# Patient Record
Sex: MALE | Race: WHITE | NOT HISPANIC OR LATINO | Employment: OTHER | URBAN - METROPOLITAN AREA
[De-identification: names, ages, dates, MRNs, and addresses within clinical notes are randomized per-mention and may not be internally consistent; named-entity substitution may affect disease eponyms.]

---

## 2023-05-03 LAB
ALBUMIN (MG/L) IN URINE: 17.3 MG/L
ALBUMIN/CREATININE (UG/MG) IN URINE: 16 UG/MG CRT (ref 0–30)
CALCIDIOL (25 OH VITAMIN D3) (NG/ML) IN SER/PLAS: 75 NG/ML
CHOLESTEROL (MG/DL) IN SER/PLAS: 170 MG/DL (ref 0–199)
CHOLESTEROL IN HDL (MG/DL) IN SER/PLAS: 61.7 MG/DL
CHOLESTEROL/HDL RATIO: 2.8
CREATININE (MG/DL) IN URINE: 108 MG/DL (ref 20–370)
LDL: 93 MG/DL (ref 0–99)
PROSTATE SPECIFIC AG (NG/ML) IN SER/PLAS: 4.11 NG/ML (ref 0–4)
TRIGLYCERIDE (MG/DL) IN SER/PLAS: 79 MG/DL (ref 0–149)
VLDL: 16 MG/DL (ref 0–40)

## 2023-09-27 PROBLEM — D22.70 MELANOCYTIC NEVI OF LOWER LIMB, INCLUDING HIP: Status: ACTIVE | Noted: 2023-05-23

## 2023-09-27 PROBLEM — R49.0 CHRONIC HOARSENESS: Status: ACTIVE | Noted: 2023-06-13

## 2023-09-27 PROBLEM — I25.10 CORONARY ARTERY STENOSIS: Status: ACTIVE | Noted: 2023-09-27

## 2023-09-27 PROBLEM — I44.0 FIRST DEGREE AV BLOCK: Status: ACTIVE | Noted: 2023-09-27

## 2023-09-27 PROBLEM — S01.302D UNSPECIFIED OPEN WOUND OF LEFT EAR, SUBSEQUENT ENCOUNTER: Status: ACTIVE | Noted: 2023-05-23

## 2023-09-27 PROBLEM — D22.39 MELANOCYTIC NEVI OF OTHER PARTS OF FACE: Status: ACTIVE | Noted: 2023-05-23

## 2023-09-27 PROBLEM — D48.5 NEOPLASM OF UNCERTAIN BEHAVIOR OF SKIN: Status: ACTIVE | Noted: 2023-05-23

## 2023-09-27 PROBLEM — E55.9 VITAMIN D DEFICIENCY: Status: ACTIVE | Noted: 2023-09-27

## 2023-09-27 PROBLEM — I45.10 RIGHT BUNDLE BRANCH BLOCK (RBBB): Status: ACTIVE | Noted: 2023-09-27

## 2023-09-27 PROBLEM — C44.219 BASAL CELL CARCINOMA OF SKIN OF LEFT EAR AND EXTERNAL AURICULAR CANAL: Status: ACTIVE | Noted: 2023-05-23

## 2023-09-27 PROBLEM — K91.840 COLONOSCOPY CAUSING POST-PROCEDURAL BLEEDING: Status: ACTIVE | Noted: 2023-09-27

## 2023-09-27 PROBLEM — D04.21 CARCINOMA IN SITU OF SKIN OF RIGHT EAR AND EXTERNAL AURICULAR CANAL: Status: ACTIVE | Noted: 2023-05-23

## 2023-09-27 PROBLEM — D49.2 NEOPLASM OF SKIN OF EAR: Status: ACTIVE | Noted: 2023-09-27

## 2023-09-27 PROBLEM — E04.9 GOITER: Status: ACTIVE | Noted: 2023-06-13

## 2023-09-27 PROBLEM — L82.1 OTHER SEBORRHEIC KERATOSIS: Status: ACTIVE | Noted: 2023-05-23

## 2023-09-27 PROBLEM — K21.9 GASTROESOPHAGEAL REFLUX DISEASE WITHOUT ESOPHAGITIS: Status: ACTIVE | Noted: 2023-06-13

## 2023-09-27 PROBLEM — E04.1 THYROID NODULE: Status: ACTIVE | Noted: 2023-07-13

## 2023-09-27 PROBLEM — L57.0 ACTINIC KERATOSIS OF LEFT TEMPLE: Status: ACTIVE | Noted: 2023-05-23

## 2023-09-27 PROBLEM — Z85.828 PERSONAL HISTORY OF OTHER MALIGNANT NEOPLASM OF SKIN: Status: ACTIVE | Noted: 2023-05-23

## 2023-09-27 PROBLEM — R97.20 ELEVATED PSA: Status: ACTIVE | Noted: 2023-09-27

## 2023-09-27 PROBLEM — D22.5 MELANOCYTIC NEVI OF TRUNK: Status: ACTIVE | Noted: 2023-05-23

## 2023-09-27 PROBLEM — D18.01 HEMANGIOMA OF SKIN AND SUBCUTANEOUS TISSUE: Status: ACTIVE | Noted: 2023-05-23

## 2023-09-27 PROBLEM — C44.222 SQUAMOUS CELL CARCINOMA OF SKIN OF RIGHT EAR AND EXTERNAL AURICULAR CANAL: Status: ACTIVE | Noted: 2023-05-23

## 2023-09-27 PROBLEM — R94.31 ABNORMAL EKG: Status: ACTIVE | Noted: 2023-09-27

## 2023-09-27 PROBLEM — L90.5 SCAR CONDITION AND FIBROSIS OF SKIN: Status: ACTIVE | Noted: 2023-05-23

## 2023-09-27 PROBLEM — E78.5 HYPERLIPIDEMIA: Status: ACTIVE | Noted: 2023-09-27

## 2023-09-27 PROBLEM — R03.0 ELEVATED BLOOD PRESSURE READING: Status: ACTIVE | Noted: 2023-09-27

## 2023-09-27 PROBLEM — L81.4 OTHER MELANIN HYPERPIGMENTATION: Status: ACTIVE | Noted: 2023-05-23

## 2023-09-27 PROBLEM — J37.0 CHRONIC LARYNGITIS: Status: ACTIVE | Noted: 2023-06-13

## 2023-09-27 PROBLEM — D22.61 MELANOCYTIC NEVI OF RIGHT UPPER LIMB, INCLUDING SHOULDER: Status: ACTIVE | Noted: 2023-05-23

## 2023-09-27 RX ORDER — ASPIRIN 325 MG
50000 TABLET, DELAYED RELEASE (ENTERIC COATED) ORAL WEEKLY
COMMUNITY
Start: 2022-03-24 | End: 2024-01-13

## 2023-09-27 RX ORDER — ATORVASTATIN CALCIUM 10 MG/1
10 TABLET, FILM COATED ORAL DAILY
COMMUNITY
Start: 2015-05-18 | End: 2023-11-01 | Stop reason: DRUGHIGH

## 2023-10-10 ENCOUNTER — OFFICE VISIT (OUTPATIENT)
Dept: DERMATOLOGY | Facility: CLINIC | Age: 73
End: 2023-10-10
Payer: MEDICARE

## 2023-10-10 DIAGNOSIS — L81.4 LENTIGO: ICD-10-CM

## 2023-10-10 DIAGNOSIS — L73.9 FOLLICULITIS: ICD-10-CM

## 2023-10-10 DIAGNOSIS — L91.8 SKIN TAG: ICD-10-CM

## 2023-10-10 DIAGNOSIS — L57.0 ACTINIC KERATOSIS: Primary | ICD-10-CM

## 2023-10-10 DIAGNOSIS — D23.21 WEATHERING NODULE OF RIGHT EAR: ICD-10-CM

## 2023-10-10 DIAGNOSIS — L90.5 SCAR CONDITIONS AND FIBROSIS OF SKIN: ICD-10-CM

## 2023-10-10 DIAGNOSIS — L82.1 SEBORRHEIC KERATOSIS: ICD-10-CM

## 2023-10-10 PROCEDURE — 17003 DESTRUCT PREMALG LES 2-14: CPT | Performed by: STUDENT IN AN ORGANIZED HEALTH CARE EDUCATION/TRAINING PROGRAM

## 2023-10-10 PROCEDURE — 17000 DESTRUCT PREMALG LESION: CPT | Performed by: STUDENT IN AN ORGANIZED HEALTH CARE EDUCATION/TRAINING PROGRAM

## 2023-10-10 PROCEDURE — 1160F RVW MEDS BY RX/DR IN RCRD: CPT | Performed by: STUDENT IN AN ORGANIZED HEALTH CARE EDUCATION/TRAINING PROGRAM

## 2023-10-10 PROCEDURE — 1126F AMNT PAIN NOTED NONE PRSNT: CPT | Performed by: STUDENT IN AN ORGANIZED HEALTH CARE EDUCATION/TRAINING PROGRAM

## 2023-10-10 PROCEDURE — 1159F MED LIST DOCD IN RCRD: CPT | Performed by: STUDENT IN AN ORGANIZED HEALTH CARE EDUCATION/TRAINING PROGRAM

## 2023-10-10 PROCEDURE — 99213 OFFICE O/P EST LOW 20 MIN: CPT | Performed by: STUDENT IN AN ORGANIZED HEALTH CARE EDUCATION/TRAINING PROGRAM

## 2023-10-10 PROCEDURE — 1036F TOBACCO NON-USER: CPT | Performed by: STUDENT IN AN ORGANIZED HEALTH CARE EDUCATION/TRAINING PROGRAM

## 2023-10-10 ASSESSMENT — DERMATOLOGY QUALITY OF LIFE (QOL) ASSESSMENT
WHAT SINGLE SKIN CONDITION LISTED BELOW IS THE PATIENT ANSWERING THE QUALITY-OF-LIFE ASSESSMENT QUESTIONS ABOUT: NONE OF THE ABOVE
RATE HOW BOTHERED YOU ARE BY EFFECTS OF YOUR SKIN PROBLEMS ON YOUR ACTIVITIES (EG, GOING OUT, ACCOMPLISHING WHAT YOU WANT, WORK ACTIVITIES OR YOUR RELATIONSHIPS WITH OTHERS): 0 - NEVER BOTHERED
RATE HOW EMOTIONALLY BOTHERED YOU ARE BY YOUR SKIN PROBLEM (FOR EXAMPLE, WORRY, EMBARRASSMENT, FRUSTRATION): 0 - NEVER BOTHERED
RATE HOW BOTHERED YOU ARE BY SYMPTOMS OF YOUR SKIN PROBLEM (EG, ITCHING, STINGING BURNING, HURTING OR SKIN IRRITATION): 0 - NEVER BOTHERED

## 2023-10-10 ASSESSMENT — DERMATOLOGY PATIENT ASSESSMENT
HAVE YOU HAD OR DO YOU HAVE A STAPH INFECTION: NO
HAVE YOU HAD OR DO YOU HAVE VASCULAR DISEASE: YES
DO YOU USE SUNSCREEN: OCCASIONALLY
DO YOU HAVE ANY NEW OR CHANGING LESIONS: YES
WHERE ARE THESE NEW OR CHANGING LESIONS LOCATED: RIGHT EAR
ARE YOU AN ORGAN TRANSPLANT RECIPIENT: NO

## 2023-10-10 ASSESSMENT — PATIENT GLOBAL ASSESSMENT (PGA): PATIENT GLOBAL ASSESSMENT: PATIENT GLOBAL ASSESSMENT:  2 - MILD

## 2023-10-10 ASSESSMENT — ITCH NUMERIC RATING SCALE: HOW SEVERE IS YOUR ITCHING?: 0

## 2023-10-10 NOTE — PROGRESS NOTES
Subjective     Ricky Go is a 73 y.o. male who presents for the following: Skin Check. Patient notes several spots of concern:  1) right preauricular cheek s/p Mohs 8/2023 - notes it is well healing, but notes a area of nodularity on the inferior portion of scar  2) White spot on the right antehelix, mildly tender  3) Scaly spots on the cheeks and forehead  4) Red spot on the right flank for the last 2 weeks.     Patient otherwise denies any new, changing, growing, bleeding, painful, itchy, or otherwise bothersome lesions. Patient denies any lesions of concern.      Review of Systems:  No other skin or systemic complaints other than what is documented elsewhere in the note.    The following portions of the chart were reviewed this encounter and updated as appropriate:         Skin Cancer History  No skin cancer on file.  Hx of BCC and SCC, no melanoma  Lesion 1: Nodular Basal Cell Carcinoma. Year Diagnosed: 2019. June. Location: Left [place]. Treatment(s): mohs done by Dr. Robertson 7/26/19. Pathology: [id number]   Lesion 2: Invasive squamous cell carcinoma. Year Diagnosed: 2022. May. Location: Right Ear. Treatment(s): Mohs 7/25/2022 Dr. 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 Robertson 8/28/23      Specialty Problems          Dermatology Problems    Actinic keratosis of left temple    Hemangioma of skin and subcutaneous tissue    Melanocytic nevi of lower limb, including hip    Melanocytic nevi of other parts of face    Melanocytic nevi of right upper limb, including shoulder    Melanocytic nevi of trunk    Neoplasm of uncertain behavior of skin    Other melanin hyperpigmentation    Other seborrheic keratosis    Personal history of other malignant neoplasm of skin    Scar condition and fibrosis of skin        Objective   Well appearing patient in no apparent distress; mood and affect are within normal limits.    A full examination was performed including scalp,  head, eyes, ears, nose, lips, neck, chest, axillae, abdomen, back, buttocks, bilateral upper extremities, bilateral lower extremities, hands, feet, fingers, toes, fingernails, and toenails. All findings within normal limits unless otherwise noted below.    Assessment/Plan   1. Actinic keratosis (3)  Left Malar Cheek, Mid Supratip of Nose, Right Zygomatic Area  Erythematous macules with gritty scale.    Actinic Keratoses - face.  The pre-cancerous nature of these lesions and treatment options were discussed with the patient today.  At this time, I recommend treatment with liquid nitrogen cryotherapy.  The patient expressed understanding, is in agreement with this plan, and wishes to proceed with cryotherapy today.      Destr of lesion - Left Malar Cheek, Mid Supratip of Nose, Right Zygomatic Area  Complexity: simple    Destruction method: cryotherapy    Informed consent: discussed and consent obtained    Lesion destroyed using liquid nitrogen: Yes    Cryotherapy cycles:  2  Outcome: patient tolerated procedure well with no complications    Post-procedure details: wound care instructions given      Related Procedures  Follow Up In Dermatology - Established Patient    2. Scar conditions and fibrosis of skin  Left Preauricular Area  Well healed scar with central area of focal nodularity and open skin at the inferior border    On the patient's left preauricular cheek, there are well-healed scars with no evidence of recurrent growth on exam today. Discussed that focal area of nodularity at the inferior pole is likely spitting stitch and patient should expect it to work itself out over the next few weeks. Discussed no need for managmenet, but advised that it may be more irritated and feel/look like a pimple over the next few weeks      3. Lentigo (3)  Head, Left Arm, Right Arm  Scattered tan macules in sun-exposed areas.    Solar Lentigines and photodamage.  The clinically benign-appearing nature of these lesions and their  relation to chronic sun exposure were discussed with the patient today and reassurance provided.  None of these lesions meet threshold for biopsy today, and thus no treatment is medically indicated for these lesions at this time.  The signs and symptoms of skin cancer were reviewed and the patient was advised to practice sun protection and sun avoidance, use daily sunscreen, and perform regular self skin exams.  The patient was instructed to monitor these lesions for any changes, such as in size, shape, or color, or associated symptoms and to call our office to schedule a return visit for re-evaluation if any such changes or symptoms are noticed in the future.  The patient expressed understanding and is in agreement with this plan.      4. Skin tag  Fleshy, skin-colored sessile and pedunculated papules.     Skin tags - scattered on trunk.  The benign nature of these lesions was discussed with the patient today and reassurance provided.  No treatment is medically indicated for these lesions at this time.     5. Seborrheic keratosis  Stuck on verrucous, tan-brown papules and plaques.      Seborrheic Keratoses - scattered on face, neck, trunk, and extremities.  The benign nature of these lesions was discussed with the patient today and reassurance provided.  No treatment is medically indicated for these lesions at this time.      6. Folliculitis  Right Flank  Follicularly-based erythematous papules and pustules.    Folliculitis - right flank.  The bacterial nature of this condition and treatment options were discussed with the patient today.  At this time, I recommend topical antibiotic therapy with Clindamycin 1% lotion, but after discussion with the patient, he defers and would like to just watch the area. The risks, benefits, and side effects of this medication were discussed.  The patient expressed understanding and is in agreement with this plan.      7. Weathering nodule of right ear  Right Ear  White, atrophic  papule with surrounding blanchable erythema    Weathering nodule - right antihelix. The benign nature of these lesions was discussed with the patient today and reassurance provided.  No treatment is medically indicated for these lesions at this time.

## 2023-11-01 ENCOUNTER — LAB (OUTPATIENT)
Dept: LAB | Facility: LAB | Age: 73
End: 2023-11-01
Payer: MEDICARE

## 2023-11-01 ENCOUNTER — OFFICE VISIT (OUTPATIENT)
Dept: PRIMARY CARE | Facility: CLINIC | Age: 73
End: 2023-11-01
Payer: MEDICARE

## 2023-11-01 VITALS
RESPIRATION RATE: 18 BRPM | OXYGEN SATURATION: 98 % | DIASTOLIC BLOOD PRESSURE: 78 MMHG | WEIGHT: 184.3 LBS | SYSTOLIC BLOOD PRESSURE: 138 MMHG | BODY MASS INDEX: 28.93 KG/M2 | TEMPERATURE: 96.6 F | HEIGHT: 67 IN | HEART RATE: 80 BPM

## 2023-11-01 DIAGNOSIS — R97.20 ELEVATED PSA: Primary | ICD-10-CM

## 2023-11-01 DIAGNOSIS — R97.20 ELEVATED PSA: ICD-10-CM

## 2023-11-01 PROCEDURE — 84154 ASSAY OF PSA FREE: CPT

## 2023-11-01 PROCEDURE — 1160F RVW MEDS BY RX/DR IN RCRD: CPT | Performed by: INTERNAL MEDICINE

## 2023-11-01 PROCEDURE — 99214 OFFICE O/P EST MOD 30 MIN: CPT | Performed by: INTERNAL MEDICINE

## 2023-11-01 PROCEDURE — 1126F AMNT PAIN NOTED NONE PRSNT: CPT | Performed by: INTERNAL MEDICINE

## 2023-11-01 PROCEDURE — 1159F MED LIST DOCD IN RCRD: CPT | Performed by: INTERNAL MEDICINE

## 2023-11-01 PROCEDURE — 36415 COLL VENOUS BLD VENIPUNCTURE: CPT

## 2023-11-01 PROCEDURE — 84153 ASSAY OF PSA TOTAL: CPT

## 2023-11-01 PROCEDURE — 1036F TOBACCO NON-USER: CPT | Performed by: INTERNAL MEDICINE

## 2023-11-01 RX ORDER — ATORVASTATIN CALCIUM 20 MG/1
TABLET, FILM COATED ORAL
COMMUNITY
Start: 2023-10-30

## 2023-11-01 ASSESSMENT — PATIENT HEALTH QUESTIONNAIRE - PHQ9
2. FEELING DOWN, DEPRESSED OR HOPELESS: NOT AT ALL
1. LITTLE INTEREST OR PLEASURE IN DOING THINGS: NOT AT ALL
SUM OF ALL RESPONSES TO PHQ9 QUESTIONS 1 AND 2: 0

## 2023-11-01 ASSESSMENT — ENCOUNTER SYMPTOMS
LOSS OF SENSATION IN FEET: 0
OCCASIONAL FEELINGS OF UNSTEADINESS: 0
DEPRESSION: 0

## 2023-11-01 NOTE — PROGRESS NOTES
"Subjective   Patient ID: Ricky Go is a 73 y.o. male who presents for Follow-up.    HPI patient is here for follow-up from his annual Medicare wellness performed in May.  The reason for the follow-up was a slightly high PSA at 4.1.  His PSA had been going up steadily.  He really does not have any significant prostate symptoms.    Review of Systems  Cardiovascular negative sees cardiologist.  Patient is not checking his blood pressure    Objective   /78 (BP Location: Right arm, Patient Position: Sitting)   Pulse 80   Temp 35.9 °C (96.6 °F)   Resp 18   Ht 1.702 m (5' 7\")   Wt 83.6 kg (184 lb 4.9 oz)   SpO2 98%   BMI 28.87 kg/m²     Physical Exam  Abdominal:      Hernia: There is no hernia in the left inguinal area or right inguinal area.   Genitourinary:     Pubic Area: No rash.       Penis: Circumcised. No phimosis.       Testes:         Right: Varicocele present.      Epididymis:      Right: Normal.      Left: Normal.      Comments: Digital rectal exam performed secondary to rising PSA.  Sphincter tone normal stools were heme-negative and brown prostate normal size soft rubbery nontender no palpable mass  Lymphadenopathy:      Lower Body: No right inguinal adenopathy. No left inguinal adenopathy.         Assessment/Plan   Diagnoses and all orders for this visit:  Elevated PSA  -     PSA, total and free; Future  Follow-up 6 months for annual Medicare wellness  Mild elevation systolic blood pressure start checking at home with an [employer] arm device and our goal is less than 130/80      - We recommend you follow a heart healthy diet. Watch food labels and try not to eat more than 2,500 mg of sodium per day. Avoid foods high in salt like processed meats (lunch meats, madison, and sausage), processed foods (boxed dinners, canned soups), fried and fast foods. Monitor serving sizes and if the sodium per serving size is more than 200 mg, avoid those foods. If the sodium per serving size is between 100-200 mg, you " can use those in limited quantities. Try to choose foods where the amount of sodium per serving size is less than 100 mg. Try to eat a diet rich in fruits and vegetables, whole grains, low fat dairy products, skinless poultry and fish, nuts, beans, non-tropical vegetable oils. Limit saturated fat, trans fat, sodium, red meats, and sugar-sweetened beverages.   Limit alcohol      -The combination of a reduced-calorie diet and increased physical activity is recommended. Adults should aim to get at least 150 minutes of moderate physical activity per week (30 minutes of moderate physical activities at least 5 days per week). Examples of moderate physical activities include brisk walking, swimming, aerobic dancing, heavy gardening, jumping rope, bicycling 10 MPH or faster, tennis, hiking uphill or with a heavy backpack. Please let us know if you would like to learn more about your nutrition and calories and additional options including weight loss programs to help you reach your goal.      -If you smoke, stop smoking. iIf you stop smoking you can help get rid of a major source of stress to your heart. Smoking makes your heart rate and blood pressure go up and increases your risk or developing cardiovascular diseases and worsen symptoms associated with heart failure.      -Obtain a BP monitor and monitor your BP daily. Check it around the same time each day; at least 1 hour after taking your medications. Record your BP in a log and bring your log with you to your doctors appointment

## 2023-11-01 NOTE — PROGRESS NOTES
"Subjective   Patient ID: Ricky Go is a 73 y.o. male who presents for Follow-up.    HPI     Review of Systems    Objective   /78 (BP Location: Right arm, Patient Position: Sitting)   Pulse 80   Temp 35.9 °C (96.6 °F)   Resp 18   Ht 1.702 m (5' 7\")   Wt 83.6 kg (184 lb 4.9 oz)   SpO2 98%   BMI 28.87 kg/m²     Physical Exam    Assessment/Plan          "

## 2023-11-04 LAB
PSA FREE MFR SERPL: 19 %
PSA FREE SERPL-MCNC: 1 NG/ML
PSA SERPL IA-MCNC: 5.3 NG/ML (ref 0–4)

## 2023-11-06 DIAGNOSIS — R97.20 RISING PSA LEVEL: Primary | ICD-10-CM

## 2023-11-06 NOTE — RESULT ENCOUNTER NOTE
Patient notified his PSA is going up slightly.  This could be his overall normal aging process however we also see PSA go up with prostate cancer therefore I would like a urology consult

## 2023-12-15 NOTE — PROGRESS NOTES
I was present during all key portions of visit including history, exam, discussion/plan and/or procedures and directly supervised our resident during all portions of the visit, follow up care, medications and more    MD Andrea Mendez MD

## 2024-01-11 ENCOUNTER — OFFICE VISIT (OUTPATIENT)
Dept: UROLOGY | Facility: HOSPITAL | Age: 74
End: 2024-01-11
Payer: MEDICARE

## 2024-01-11 DIAGNOSIS — R97.20 RISING PSA LEVEL: ICD-10-CM

## 2024-01-11 DIAGNOSIS — E55.9 VITAMIN D DEFICIENCY: ICD-10-CM

## 2024-01-11 PROCEDURE — 1036F TOBACCO NON-USER: CPT | Performed by: UROLOGY

## 2024-01-11 PROCEDURE — 99214 OFFICE O/P EST MOD 30 MIN: CPT | Performed by: UROLOGY

## 2024-01-11 PROCEDURE — 1159F MED LIST DOCD IN RCRD: CPT | Performed by: UROLOGY

## 2024-01-11 PROCEDURE — 1160F RVW MEDS BY RX/DR IN RCRD: CPT | Performed by: UROLOGY

## 2024-01-11 PROCEDURE — 99204 OFFICE O/P NEW MOD 45 MIN: CPT | Performed by: UROLOGY

## 2024-01-11 PROCEDURE — 1126F AMNT PAIN NOTED NONE PRSNT: CPT | Performed by: UROLOGY

## 2024-01-11 ASSESSMENT — PAIN SCALES - GENERAL: PAINLEVEL: 0-NO PAIN

## 2024-01-11 NOTE — PROGRESS NOTES
NAME:Ricky Go  DATE: 2024               Subjective:   Chief complaint: Elevated PSA    HPI:  73 y.o. male presenting for evaluation of elevated PSA at the request of Dr. Taveras     Most recent PSA 5.3 (19% free).      No bothersome urinary issues.  Stream a little slower, sometime difficult to start.  No dysuria or hematuria.  Rare nocturia.      Not sexually active    Past Medical History:   Diagnosis Date    Personal history of other diseases of the circulatory system 10/30/2015    History of coronary stenosis     Past Surgical History:   Procedure Laterality Date    TONSILLECTOMY  2018    Tonsillectomy     Social History     Tobacco Use    Smoking status: Former     Types: Cigarettes     Start date: 10/1/1970     Quit date: 10/1/1989     Years since quittin.3    Smokeless tobacco: Never   Substance Use Topics    Alcohol use: Yes     Alcohol/week: 5.0 standard drinks of alcohol     Types: 5 Shots of liquor per week     No family history on file.  [unfilled]  Current Outpatient Medications on File Prior to Visit   Medication Sig Dispense Refill    atorvastatin (Lipitor) 20 mg tablet       cholecalciferol (Vitamin D-3) 1,250 mcg (50,000 unit) capsule Take 1 capsule (50,000 Units) by mouth once a week.       No current facility-administered medications on file prior to visit.     Ricky is allergic to shellfish derived.     Review of Systems    14 point ROS reviewed and discussed with the patient. Pertinent positives/negatives discussed in the History of Present Illness (HPI).      FH:  Prostate CA: No  Bladder CA: No  Kidney CA: No  Stones: No    Social History  Occupation: Retired, worked as   Tob: No  Etoh: Yes      Objective:     There is no height or weight on file to calculate BMI.   There were no vitals taken for this visit.     Physical Exam   1. Constitutional: NAD, Well-developed, Well-nourished  2. Respiratory: Unlabored breathing, no audible wheezes, no use of  accessory muscles   3. Cardiovascular: No JVD, extremities perfused, no edema  4. Abdomen: Soft, non-tender, non-distended, no masses or hernia.  No CVA tenderness.    5. Skin: no visible lesions, plaque, rashes, jaundice  6. Neuro: Gait normal, no focal neurologic deficit  7. Psychiatric: Mood and affect normal and appropriate, alert and oriented  8. :    Phallus: Normal, no lesions.     Meatus: Orthotopic and patent.   Testes:  Descended bilaterally, symmetric, without tenderness or mass.   Epididymis is normal bilaterally.  No hydrocele.  No varicocele.   Scrotum: No lesions.   Inguinal canal: No hernia or tenderness.      Labs  Lab Results   Component Value Date    PSA 5.3 (H) 11/01/2023     Lab Results   Component Value Date    GFRMALE 78 05/02/2022     Lab Results   Component Value Date    CREATININE 1.02 05/02/2022     Lab Results   Component Value Date    CHOL 170 05/03/2023     Lab Results   Component Value Date    HDL 61.7 05/03/2023     Lab Results   Component Value Date    CHHDL 2.8 05/03/2023     Lab Results   Component Value Date    LDLF 93 05/03/2023     Lab Results   Component Value Date    VLDL 16 05/03/2023     Lab Results   Component Value Date    TRIG 79 05/03/2023     Lab Results   Component Value Date    HCT 44.3 05/02/2022       Assessment/Plan:   Ricky Go presents with       1. Rising PSA level        Elevated PSA  Patient presents today for the evaluation of elevated PSA (Prostate Specific Antigen).  We discussed Prostate cancer screening, and that it is recommended for men aged 55-69, but can also start early in high risk patients ( and patients with family history of prostate cancer) and go longer in active, healthy men.  We discussed the screening process involves a digital rectal exam (KEMI) and blood test (PSA).      We discussed the role of trans-rectal ultrasound guided prostate biopsy.  I highlighted that it is an office based procedure.  He will be given a dose  of antibiotics prior to the procedure.  He was also instructed to give himself an enema the morning of the biopsy.  Risks of the biopsy including pain, blood in the urine, stool and ejaculate which can last a few weeks.  The risk of post prostate biopsy sepsis was discussed and that if signs of infection, such as fevers, chills, lethargy require a prompt evaluation.      I highlighted the role of MRI Prostate in identifying high-risk prostate cancer and images can be used to target those areas with the biopsy.      Plan to proceed with MRI prostate

## 2024-01-13 RX ORDER — ASPIRIN 325 MG
50000 TABLET, DELAYED RELEASE (ENTERIC COATED) ORAL
Qty: 12 CAPSULE | Refills: 1 | Status: SHIPPED | OUTPATIENT
Start: 2024-01-13

## 2024-02-25 ENCOUNTER — HOSPITAL ENCOUNTER (OUTPATIENT)
Dept: RADIOLOGY | Facility: HOSPITAL | Age: 74
Discharge: HOME | End: 2024-02-25
Payer: MEDICARE

## 2024-02-25 DIAGNOSIS — R97.20 RISING PSA LEVEL: ICD-10-CM

## 2024-02-25 PROCEDURE — 6100000002 MR PROSTATE SCREENING SELF PAY EXAM

## 2024-02-29 ENCOUNTER — OFFICE VISIT (OUTPATIENT)
Dept: UROLOGY | Facility: HOSPITAL | Age: 74
End: 2024-02-29
Payer: MEDICARE

## 2024-02-29 DIAGNOSIS — R97.20 ELEVATED PSA: Primary | ICD-10-CM

## 2024-02-29 PROCEDURE — 1036F TOBACCO NON-USER: CPT | Performed by: UROLOGY

## 2024-02-29 PROCEDURE — 1126F AMNT PAIN NOTED NONE PRSNT: CPT | Performed by: UROLOGY

## 2024-02-29 PROCEDURE — 1159F MED LIST DOCD IN RCRD: CPT | Performed by: UROLOGY

## 2024-02-29 PROCEDURE — 99214 OFFICE O/P EST MOD 30 MIN: CPT | Performed by: UROLOGY

## 2024-02-29 ASSESSMENT — PAIN SCALES - GENERAL: PAINLEVEL: 0-NO PAIN

## 2024-02-29 NOTE — PROGRESS NOTES
NAME:Ricky Go  DATE: 2024               Subjective:   Chief complaint: Elevated PSA    HPI:  73 y.o. male presenting for evaluation of elevated PSA at the request of Dr. Taveras     Most recent PSA 5.3 (19% free).      No bothersome urinary issues.  Stream a little slower, sometime difficult to start.  No dysuria or hematuria.  Rare nocturia.      Not sexually active    Past Medical History:   Diagnosis Date    Personal history of other diseases of the circulatory system 10/30/2015    History of coronary stenosis     Past Surgical History:   Procedure Laterality Date    TONSILLECTOMY  2018    Tonsillectomy     Social History     Tobacco Use    Smoking status: Former     Types: Cigarettes     Start date: 10/1/1970     Quit date: 10/1/1989     Years since quittin.4    Smokeless tobacco: Never   Substance Use Topics    Alcohol use: Yes     Alcohol/week: 5.0 standard drinks of alcohol     Types: 5 Shots of liquor per week     No family history on file.    Current Outpatient Medications on File Prior to Visit   Medication Sig Dispense Refill    atorvastatin (Lipitor) 20 mg tablet       cholecalciferol (Vitamin D-3) 50,000 unit capsule TAKE 1 CAPSULE BY MOUTH ONCE WEEKLY 12 capsule 1     No current facility-administered medications on file prior to visit.     Ricky is allergic to shellfish derived.     Review of Systems    14 point ROS reviewed and discussed with the patient. Pertinent positives/negatives discussed in the History of Present Illness (HPI).    FH:  Prostate CA: No  Bladder CA: No  Kidney CA: No  Stones: No    Social History  Occupation: Retired, worked as   Tob: No  Etoh: Yes      Objective:     There is no height or weight on file to calculate BMI.   There were no vitals taken for this visit.     Physical Exam   1. Constitutional: NAD, Well-developed, Well-nourished  2. Respiratory: Unlabored breathing, no audible wheezes, no use of accessory muscles   3.  Cardiovascular: No JVD, extremities perfused, no edema  4. Abdomen: Soft, non-tender, non-distended, no masses or hernia.  No CVA tenderness.    5. Skin: no visible lesions, plaque, rashes, jaundice  6. Neuro: Gait normal, no focal neurologic deficit  7. Psychiatric: Mood and affect normal and appropriate, alert and oriented    Labs  Lab Results   Component Value Date    PSA 5.3 (H) 11/01/2023     Lab Results   Component Value Date    GFRMALE 78 05/02/2022     Lab Results   Component Value Date    CREATININE 1.02 05/02/2022     Lab Results   Component Value Date    CHOL 170 05/03/2023     Lab Results   Component Value Date    HDL 61.7 05/03/2023     Lab Results   Component Value Date    CHHDL 2.8 05/03/2023     Lab Results   Component Value Date    LDLF 93 05/03/2023     Lab Results   Component Value Date    VLDL 16 05/03/2023     Lab Results   Component Value Date    TRIG 79 05/03/2023     Lab Results   Component Value Date    HCT 44.3 05/02/2022     TECHNIQUE:  Multiplanar MRI of the pelvis was obtained focused on the prostate  gland and following a screening protocol including axial, sagittal  and coronal T2 weighted SSFSE, axial T2 FSE and axial DWI. No  intravenous contrast was administered.      FINDINGS:  PROSTATE VOLUME:  The prostate measures 5.7 cm x 4.7 cm  x 6.5 cm in right-to-left,  anterior-posterior and craniocaudal dimension.      Prostate weight is estimated at 91g.      PROSTATE PARENCHYMA:  There is severe hypertrophy of the of the transition zone, consistent  with benign prostatic hyperplasia. The peripheral zone is partly  compressed. No definite focal lesion seen in the peripheral or the  transition zone.      EXTRACAPSULAR EXTENSION:  None.      SEMINAL VESICLES:  Within normal limits.      PELVIC LYMPH NODES:  No abnormally enlarged pelvic lymph nodes are identified.      PERITONEUM:  No free or loculated fluid collections are evident in the pelvis.      OTHER ORGANS:  Urinary bladder is  partially distended and diffusely thick-walled.  Layering material within the bladder may represent debris or calculi.  Visualized sigmoid colon demonstrates diverticulosis.      BONES:  No focal lesions are noted in the bone.      Exam Quality:  Is T2WI weighted imaging of diagnostic quality:  Yes. T2WI  assessment:  Adequate. Is DWI of diagnostic quality:  Yes. DWI  assessment:  Adequate. Is DCE of diagnostic quality:  N/A. DCE  assessment:  N/A. PI-QUAL score:  At least two sequences taken  together are of diagnostic quality Comments:      IMPRESSION:  1. There is no evidence of clinically significant neoplasm.  2. Severe hypertrophy of the transition zone consistent with benign  prostatic hyperplasia.    Assessment/Plan:   Ricky Go presents with       1. Elevated PSA      Reassuring MRI    Elevated PSA  Patient presents today for the evaluation of elevated PSA (Prostate Specific Antigen).  We discussed Prostate cancer screening, and that it is recommended for men aged 55-69, but can also start early in high risk patients ( and patients with family history of prostate cancer) and go longer in active, healthy men.  We discussed the screening process involves a digital rectal exam (KEMI) and blood test (PSA).      We discussed the role of trans-rectal ultrasound guided prostate biopsy.  I highlighted that it is an office based procedure.  He will be given a dose of antibiotics prior to the procedure.  He was also instructed to give himself an enema the morning of the biopsy.  Risks of the biopsy including pain, blood in the urine, stool and ejaculate which can last a few weeks.  The risk of post prostate biopsy sepsis was discussed and that if signs of infection, such as fevers, chills, lethargy require a prompt evaluation.      I highlighted the role of MRI Prostate in identifying high-risk prostate cancer and images can be used to target those areas with the biopsy.      Continue with routine  PSA screening

## 2024-04-09 ENCOUNTER — OFFICE VISIT (OUTPATIENT)
Dept: DERMATOLOGY | Facility: CLINIC | Age: 74
End: 2024-04-09
Payer: MEDICARE

## 2024-04-09 DIAGNOSIS — L57.0 ACTINIC KERATOSIS: ICD-10-CM

## 2024-04-09 DIAGNOSIS — D22.9 MULTIPLE BENIGN NEVI: ICD-10-CM

## 2024-04-09 DIAGNOSIS — L82.1 SEBORRHEIC KERATOSIS: ICD-10-CM

## 2024-04-09 DIAGNOSIS — L81.4 LENTIGO: ICD-10-CM

## 2024-04-09 DIAGNOSIS — L21.9 SEBORRHEIC DERMATITIS: Primary | ICD-10-CM

## 2024-04-09 PROCEDURE — 99213 OFFICE O/P EST LOW 20 MIN: CPT | Performed by: STUDENT IN AN ORGANIZED HEALTH CARE EDUCATION/TRAINING PROGRAM

## 2024-04-09 PROCEDURE — 17003 DESTRUCT PREMALG LES 2-14: CPT | Performed by: STUDENT IN AN ORGANIZED HEALTH CARE EDUCATION/TRAINING PROGRAM

## 2024-04-09 PROCEDURE — 17000 DESTRUCT PREMALG LESION: CPT | Performed by: STUDENT IN AN ORGANIZED HEALTH CARE EDUCATION/TRAINING PROGRAM

## 2024-04-09 PROCEDURE — 1159F MED LIST DOCD IN RCRD: CPT | Performed by: STUDENT IN AN ORGANIZED HEALTH CARE EDUCATION/TRAINING PROGRAM

## 2024-04-09 ASSESSMENT — DERMATOLOGY QUALITY OF LIFE (QOL) ASSESSMENT
WHAT SINGLE SKIN CONDITION LISTED BELOW IS THE PATIENT ANSWERING THE QUALITY-OF-LIFE ASSESSMENT QUESTIONS ABOUT: NONE OF THE ABOVE
RATE HOW EMOTIONALLY BOTHERED YOU ARE BY YOUR SKIN PROBLEM (FOR EXAMPLE, WORRY, EMBARRASSMENT, FRUSTRATION): 0 - NEVER BOTHERED
RATE HOW BOTHERED YOU ARE BY SYMPTOMS OF YOUR SKIN PROBLEM (EG, ITCHING, STINGING BURNING, HURTING OR SKIN IRRITATION): 0 - NEVER BOTHERED
RATE HOW BOTHERED YOU ARE BY EFFECTS OF YOUR SKIN PROBLEMS ON YOUR ACTIVITIES (EG, GOING OUT, ACCOMPLISHING WHAT YOU WANT, WORK ACTIVITIES OR YOUR RELATIONSHIPS WITH OTHERS): 0 - NEVER BOTHERED

## 2024-04-09 ASSESSMENT — PATIENT GLOBAL ASSESSMENT (PGA): PATIENT GLOBAL ASSESSMENT: PATIENT GLOBAL ASSESSMENT:  1 - CLEAR

## 2024-04-09 ASSESSMENT — DERMATOLOGY PATIENT ASSESSMENT
HAVE YOU HAD OR DO YOU HAVE A STAPH INFECTION: NO
WHERE ARE THESE NEW OR CHANGING LESIONS LOCATED: LEFT CHEEK
DO YOU USE A TANNING BED: NO
DO YOU HAVE ANY NEW OR CHANGING LESIONS: YES
DO YOU USE SUNSCREEN: OCCASIONALLY
ARE YOU AN ORGAN TRANSPLANT RECIPIENT: NO
HAVE YOU HAD OR DO YOU HAVE VASCULAR DISEASE: NO

## 2024-04-09 NOTE — PROGRESS NOTES
Subjective     Ricky Go is a 73 y.o. male who presents for the following: Skin Check. Patient notes no areas of concern, does have a few crusty spots on the left cheek he would like examined. Patient otherwise denies any new, changing, growing, bleeding, painful, itchy, or otherwise bothersome lesions. Patient denies any lesions of concern.      Review of Systems:  No other skin or systemic complaints other than what is documented elsewhere in the note.    The following portions of the chart were reviewed this encounter and updated as appropriate:          Skin Cancer History  No skin cancer on file. History of multiple [facility] s/p Mohs w/ Dr. Robertson      Specialty Problems          Dermatology Problems    Actinic keratosis of left temple    Hemangioma of skin and subcutaneous tissue    Melanocytic nevi of lower limb, including hip    Melanocytic nevi of other parts of face    Melanocytic nevi of right upper limb, including shoulder    Melanocytic nevi of trunk    Neoplasm of uncertain behavior of skin    Other melanin hyperpigmentation    Other seborrheic keratosis    Personal history of other malignant neoplasm of skin    Scar condition and fibrosis of skin        Objective   Well appearing patient in no apparent distress; mood and affect are within normal limits.    A focused skin examination was performed. All findings within normal limits unless otherwise noted below.    Assessment/Plan   1. Seborrheic dermatitis  Scalp  Erythema with overlying greasy scale.    Seborrheic Dermatitis - scalp, eyelids.  The potentially chronic and intermittently flaring nature of this condition and treatment options were discussed extensively with the patient today.  After discussion, patient elected to defer treatment as this does not bother him    2. Actinic keratosis (8)  Head - Anterior (Face) (2), Left Preauricular Area, Left Temple, Left Zygomatic Area, Right Buccal Cheek, Right Malar Cheek, Right Zygomatic  Area  Erythematous macules with gritty scale on the bilateral cheeks and temples    Actinic Keratoses - bilateral cheeks and temples.  The pre-cancerous nature of these lesions and treatment options were discussed with the patient today.  At this time, I recommend treatment with liquid nitrogen cryotherapy.  The patient expressed understanding, is in agreement with this plan, and wishes to proceed with cryotherapy today. Did also discuss treatment with ALA-PDT but patient declines for today, will think about    8 AKs on the bilateral cheeks and temples.     Destr of lesion - Head - Anterior (Face) (2), Left Preauricular Area, Left Temple, Left Zygomatic Area, Right Buccal Cheek, Right Malar Cheek, Right Zygomatic Area  Complexity: simple    Destruction method: cryotherapy    Informed consent: discussed and consent obtained    Lesion destroyed using liquid nitrogen: Yes    Cryotherapy cycles:  1  Outcome: patient tolerated procedure well with no complications    Post-procedure details: wound care instructions given      Related Procedures  Follow Up In Dermatology - Established Patient  Follow Up In Dermatology - Established Patient    3. Seborrheic keratosis  Stuck on verrucous, tan-brown papules and plaques.      Seborrheic Keratoses - the benign nature of these lesions was discussed with the patient today and reassurance provided.  No treatment is medically indicated for these lesions at this time.      4. Lentigo  Scattered tan macules in sun-exposed areas.    Solar Lentigines and photodamage.  The clinically benign-appearing nature of these lesions and their relation to chronic sun exposure were discussed with the patient today and reassurance provided.  None of these lesions meet threshold for biopsy today, and thus no treatment is medically indicated for these lesions at this time.  The signs and symptoms of skin cancer were reviewed and the patient was advised to practice sun protection and sun avoidance, use  daily sunscreen, and perform regular self skin exams.  The patient was instructed to monitor these lesions for any changes, such as in size, shape, or color, or associated symptoms and to call our office to schedule a return visit for re-evaluation if any such changes or symptoms are noticed in the future.  The patient expressed understanding and is in agreement with this plan.    5. Multiple benign nevi  Scattered, uniform and benign-appearing, regular brown melanocytic papules and macules.    Clinically benign- to slightly atypical-appearing nevi - the clinically benign- to slightly atypical-appearing nature of the patient's nevi was discussed with the patient today.  None of the patient's nevi meet threshold for biopsy today.  I emphasized the importance of performing monthly self-skin exams using the ABCDs of monitoring moles, which were reviewed with the patient today and an informational hand-out provided.  I also emphasized the importance of sun avoidance and sun protection with daily sunscreen use.  The patient expressed understanding and is in agreement with this plan.     RTC 6 months for TBSE  Navin Jones MD PGY 4 Dermatology    I was present during all portions of visit and supervised resident directly   Including any discussion and performing of procedures as well as medical management and follow up care  Andrea Carballo

## 2024-05-06 ENCOUNTER — LAB (OUTPATIENT)
Dept: LAB | Facility: LAB | Age: 74
End: 2024-05-06
Payer: MEDICARE

## 2024-05-06 ENCOUNTER — OFFICE VISIT (OUTPATIENT)
Dept: PRIMARY CARE | Facility: CLINIC | Age: 74
End: 2024-05-06
Payer: MEDICARE

## 2024-05-06 VITALS
BODY MASS INDEX: 28.98 KG/M2 | WEIGHT: 185 LBS | HEART RATE: 68 BPM | DIASTOLIC BLOOD PRESSURE: 67 MMHG | SYSTOLIC BLOOD PRESSURE: 124 MMHG | OXYGEN SATURATION: 93 %

## 2024-05-06 DIAGNOSIS — E55.9 VITAMIN D DEFICIENCY: ICD-10-CM

## 2024-05-06 DIAGNOSIS — E78.5 HYPERLIPIDEMIA, UNSPECIFIED HYPERLIPIDEMIA TYPE: ICD-10-CM

## 2024-05-06 DIAGNOSIS — R97.20 RISING PSA LEVEL: ICD-10-CM

## 2024-05-06 DIAGNOSIS — Z00.00 HEALTH CARE MAINTENANCE: ICD-10-CM

## 2024-05-06 DIAGNOSIS — Z00.00 HEALTH CARE MAINTENANCE: Primary | ICD-10-CM

## 2024-05-06 LAB
25(OH)D3 SERPL-MCNC: 91 NG/ML (ref 30–100)
ALBUMIN SERPL BCP-MCNC: 4.2 G/DL (ref 3.4–5)
ALP SERPL-CCNC: 79 U/L (ref 33–136)
ALT SERPL W P-5'-P-CCNC: 32 U/L (ref 10–52)
ANION GAP SERPL CALC-SCNC: 13 MMOL/L (ref 10–20)
AST SERPL W P-5'-P-CCNC: 24 U/L (ref 9–39)
BILIRUB SERPL-MCNC: 0.9 MG/DL (ref 0–1.2)
BUN SERPL-MCNC: 13 MG/DL (ref 6–23)
CALCIUM SERPL-MCNC: 9.2 MG/DL (ref 8.6–10.6)
CHLORIDE SERPL-SCNC: 105 MMOL/L (ref 98–107)
CHOLEST SERPL-MCNC: 160 MG/DL (ref 0–199)
CHOLESTEROL/HDL RATIO: 2.3
CO2 SERPL-SCNC: 28 MMOL/L (ref 21–32)
CREAT SERPL-MCNC: 0.99 MG/DL (ref 0.5–1.3)
EGFRCR SERPLBLD CKD-EPI 2021: 79 ML/MIN/1.73M*2
GLUCOSE SERPL-MCNC: 100 MG/DL (ref 74–99)
HDLC SERPL-MCNC: 69.9 MG/DL
LDLC SERPL CALC-MCNC: 75 MG/DL
NON HDL CHOLESTEROL: 90 MG/DL (ref 0–149)
POTASSIUM SERPL-SCNC: 4.6 MMOL/L (ref 3.5–5.3)
PROT SERPL-MCNC: 6.5 G/DL (ref 6.4–8.2)
PSA SERPL-MCNC: 6.14 NG/ML
SODIUM SERPL-SCNC: 141 MMOL/L (ref 136–145)
TRIGL SERPL-MCNC: 74 MG/DL (ref 0–149)
VLDL: 15 MG/DL (ref 0–40)

## 2024-05-06 PROCEDURE — 1160F RVW MEDS BY RX/DR IN RCRD: CPT | Performed by: INTERNAL MEDICINE

## 2024-05-06 PROCEDURE — 1170F FXNL STATUS ASSESSED: CPT | Performed by: INTERNAL MEDICINE

## 2024-05-06 PROCEDURE — 1158F ADVNC CARE PLAN TLK DOCD: CPT | Performed by: INTERNAL MEDICINE

## 2024-05-06 PROCEDURE — 1159F MED LIST DOCD IN RCRD: CPT | Performed by: INTERNAL MEDICINE

## 2024-05-06 PROCEDURE — 1036F TOBACCO NON-USER: CPT | Performed by: INTERNAL MEDICINE

## 2024-05-06 PROCEDURE — 99213 OFFICE O/P EST LOW 20 MIN: CPT | Performed by: INTERNAL MEDICINE

## 2024-05-06 PROCEDURE — 80061 LIPID PANEL: CPT

## 2024-05-06 PROCEDURE — G0439 PPPS, SUBSEQ VISIT: HCPCS | Performed by: INTERNAL MEDICINE

## 2024-05-06 PROCEDURE — 80053 COMPREHEN METABOLIC PANEL: CPT

## 2024-05-06 PROCEDURE — 82306 VITAMIN D 25 HYDROXY: CPT

## 2024-05-06 PROCEDURE — 36415 COLL VENOUS BLD VENIPUNCTURE: CPT

## 2024-05-06 PROCEDURE — 1123F ACP DISCUSS/DSCN MKR DOCD: CPT | Performed by: INTERNAL MEDICINE

## 2024-05-06 PROCEDURE — 84153 ASSAY OF PSA TOTAL: CPT

## 2024-05-06 ASSESSMENT — ENCOUNTER SYMPTOMS
DEPRESSION: 0
POLYDIPSIA: 0
LOSS OF SENSATION IN FEET: 0
SHORTNESS OF BREATH: 0
DYSPHORIC MOOD: 0
OCCASIONAL FEELINGS OF UNSTEADINESS: 0
ARTHRALGIAS: 1
GASTROINTESTINAL NEGATIVE: 1
DIFFICULTY URINATING: 0

## 2024-05-06 ASSESSMENT — PATIENT HEALTH QUESTIONNAIRE - PHQ9
1. LITTLE INTEREST OR PLEASURE IN DOING THINGS: NOT AT ALL
SUM OF ALL RESPONSES TO PHQ9 QUESTIONS 1 AND 2: 0
2. FEELING DOWN, DEPRESSED OR HOPELESS: NOT AT ALL

## 2024-05-06 ASSESSMENT — ACTIVITIES OF DAILY LIVING (ADL)
TAKING_MEDICATION: INDEPENDENT
TAKING_MEDICATION: INDEPENDENT
GROCERY_SHOPPING: INDEPENDENT
DOING_HOUSEWORK: INDEPENDENT
GROCERY_SHOPPING: INDEPENDENT
DRESSING: INDEPENDENT
MANAGING_FINANCES: INDEPENDENT
DRESSING: INDEPENDENT
BATHING: INDEPENDENT
MANAGING_FINANCES: INDEPENDENT
BATHING: INDEPENDENT

## 2024-05-06 NOTE — PROGRESS NOTES
Subjective   Reason for Visit: Ricky Go is an 74 y.o. male here for a Medicare Wellness visit.     Past Medical, Surgical, and Family History reviewed and updated in chart.    Reviewed all medications by prescribing practitioner or clinical pharmacist (such as prescriptions, OTCs, herbal therapies and supplements) and documented in the medical record.    HPI  73 yo wm for amw     Exercise wts and walking often  Patient Care Team:  Barak Taveras MD as PCP - General (Internal Medicine)  Barak Taveras MD as PCP - [employer] PCP     Review of Systems   Respiratory:  Negative for shortness of breath.    Cardiovascular:  Negative for chest pain.   Gastrointestinal: Negative.    Endocrine: Negative for polydipsia and polyuria.   Genitourinary:  Negative for difficulty urinating.   Musculoskeletal:  Positive for arthralgias (comes and goes).   Neurological:  Negative for syncope.   Psychiatric/Behavioral:  Negative for dysphoric mood.        Objective   Vitals:  /67 (BP Location: Right arm, Patient Position: Sitting)   Pulse 68   Wt 83.9 kg (185 lb)   SpO2 93%   BMI 28.98 kg/m²       Physical Exam  Constitutional:       Appearance: He is normal weight.   HENT:      Right Ear: Tympanic membrane normal.      Left Ear: Tympanic membrane normal.   Eyes:      Conjunctiva/sclera: Conjunctivae normal.   Neck:      Vascular: No carotid bruit.   Cardiovascular:      Rate and Rhythm: Regular rhythm.      Pulses: Normal pulses.      Heart sounds: No murmur heard.  Pulmonary:      Breath sounds: Normal breath sounds.   Abdominal:      Hernia: A hernia (Soft easily reducible umbilical hernia with a rectus diastases) is present.   Musculoskeletal:         General: No swelling.      Cervical back: No rigidity or tenderness.      Right lower leg: No edema.      Left lower leg: No edema.   Lymphadenopathy:      Cervical: No cervical adenopathy.   Skin:     Comments: Just saw Derm 1 month ago    Neurological:      Mental Status: He is alert and oriented to person, place, and time.   Psychiatric:         Mood and Affect: Mood normal.         Assessment/Plan   Diagnoses and all orders for this visit:  Health care maintenance  -     Comprehensive Metabolic Panel; Future  Hyperlipidemia, unspecified hyperlipidemia type  -     Lipid Panel; Future  Vitamin D deficiency  -     Vitamin D 25-Hydroxy,Total (for eval of Vitamin D levels); Future  Rising PSA level  -     PSA; Future  Skin cancer where daily moisturizer with SPF 50

## 2024-07-20 DIAGNOSIS — E78.5 HYPERLIPIDEMIA, UNSPECIFIED: ICD-10-CM

## 2024-07-22 RX ORDER — ATORVASTATIN CALCIUM 20 MG/1
20 TABLET, FILM COATED ORAL DAILY
Qty: 90 TABLET | Refills: 3 | Status: SHIPPED | OUTPATIENT
Start: 2024-07-22

## 2024-08-01 DIAGNOSIS — E55.9 VITAMIN D DEFICIENCY: ICD-10-CM

## 2024-08-01 RX ORDER — ASPIRIN 325 MG
50000 TABLET, DELAYED RELEASE (ENTERIC COATED) ORAL
Qty: 12 CAPSULE | Refills: 1 | Status: SHIPPED | OUTPATIENT
Start: 2024-08-04

## 2024-08-22 ENCOUNTER — APPOINTMENT (OUTPATIENT)
Dept: OTOLARYNGOLOGY | Facility: CLINIC | Age: 74
End: 2024-08-22
Payer: MEDICARE

## 2024-08-22 VITALS — BODY MASS INDEX: 29.03 KG/M2 | WEIGHT: 185 LBS | HEIGHT: 67 IN | TEMPERATURE: 98.1 F

## 2024-08-22 DIAGNOSIS — H61.23 BILATERAL IMPACTED CERUMEN: Primary | ICD-10-CM

## 2024-08-22 DIAGNOSIS — K21.9 GASTROESOPHAGEAL REFLUX DISEASE WITHOUT ESOPHAGITIS: ICD-10-CM

## 2024-08-22 DIAGNOSIS — J37.0 CHRONIC LARYNGITIS: ICD-10-CM

## 2024-08-22 PROCEDURE — 1159F MED LIST DOCD IN RCRD: CPT | Performed by: OTOLARYNGOLOGY

## 2024-08-22 PROCEDURE — 99212 OFFICE O/P EST SF 10 MIN: CPT | Performed by: OTOLARYNGOLOGY

## 2024-08-22 PROCEDURE — 1160F RVW MEDS BY RX/DR IN RCRD: CPT | Performed by: OTOLARYNGOLOGY

## 2024-08-22 PROCEDURE — 1036F TOBACCO NON-USER: CPT | Performed by: OTOLARYNGOLOGY

## 2024-08-22 PROCEDURE — 69210 REMOVE IMPACTED EAR WAX UNI: CPT | Performed by: OTOLARYNGOLOGY

## 2024-08-22 NOTE — PROGRESS NOTES
Subjective   Patient ID: Ricky Go is a 74 y.o. male  HPI  Patient presents for follow-up for history of chronic gastroesophageal reflux with chronic laryngitis.  He has not had any hoarseness or dysphagia and he is not requiring any medications at this point.  Review of Systems    Objective   Physical Exam  There is cerumen impaction bilaterally and this was cleared using speculum and curettes.  The tympanic membranes are clear and mobile.  The oral cavity is clear.  Indirect mirror laryngoscopy is limited due to strong gag reflex and there is good vocal cord motion noted bilaterally.  The neck is supple there are no masses or lymph nodes palpable the thyroid is soft and symmetric.    Ear cerumen removal    Date/Time: 8/22/2024 10:08 AM    Performed by: Rosa Maria Dotson MD  Authorized by: Rosa Maria Dotson MD    Consent:     Consent obtained:  Verbal    Risks discussed:  Pain  Procedure details:     Location:  L ear and R ear    Procedure type: curette        Assessment/Plan   Diagnoses and all orders for this visit:  Bilateral impacted cerumen (Primary)  -     Ear cerumen removal  Chronic laryngitis  Gastroesophageal reflux disease without esophagitis     1.  History of chronic gastroesophageal reflux with chronic laryngitis and mild hoarseness resolved with acid reflux precautions and no longer requiring omeprazole.  2.  History of suspected thyroid nodule with no nodule identified on the ultrasound-guided fine-needle aspiration biopsy attempt.  3.  Bilateral cerumen impaction cleared today.  He will follow-up as needed.

## 2024-08-28 ENCOUNTER — OFFICE VISIT (OUTPATIENT)
Dept: CARDIOLOGY | Facility: CLINIC | Age: 74
End: 2024-08-28
Payer: MEDICARE

## 2024-08-28 VITALS
SYSTOLIC BLOOD PRESSURE: 121 MMHG | OXYGEN SATURATION: 95 % | HEIGHT: 67 IN | WEIGHT: 185.5 LBS | HEART RATE: 64 BPM | BODY MASS INDEX: 29.11 KG/M2 | DIASTOLIC BLOOD PRESSURE: 62 MMHG

## 2024-08-28 DIAGNOSIS — I25.10 NON-OCCLUSIVE CORONARY ARTERY DISEASE: Primary | ICD-10-CM

## 2024-08-28 DIAGNOSIS — I45.10 RIGHT BUNDLE BRANCH BLOCK (RBBB): ICD-10-CM

## 2024-08-28 DIAGNOSIS — R94.31 ABNORMAL EKG: ICD-10-CM

## 2024-08-28 DIAGNOSIS — E78.5 HYPERLIPIDEMIA, UNSPECIFIED HYPERLIPIDEMIA TYPE: ICD-10-CM

## 2024-08-28 PROCEDURE — 1126F AMNT PAIN NOTED NONE PRSNT: CPT | Performed by: INTERNAL MEDICINE

## 2024-08-28 PROCEDURE — 99214 OFFICE O/P EST MOD 30 MIN: CPT | Performed by: INTERNAL MEDICINE

## 2024-08-28 PROCEDURE — 1036F TOBACCO NON-USER: CPT | Performed by: INTERNAL MEDICINE

## 2024-08-28 PROCEDURE — 1159F MED LIST DOCD IN RCRD: CPT | Performed by: INTERNAL MEDICINE

## 2024-08-28 PROCEDURE — 1160F RVW MEDS BY RX/DR IN RCRD: CPT | Performed by: INTERNAL MEDICINE

## 2024-08-28 ASSESSMENT — ENCOUNTER SYMPTOMS
HEMATOLOGIC/LYMPHATIC NEGATIVE: 1
DEPRESSION: 0
ENDOCRINE NEGATIVE: 1
RESPIRATORY NEGATIVE: 1
OCCASIONAL FEELINGS OF UNSTEADINESS: 0
ALLERGIC/IMMUNOLOGIC NEGATIVE: 1
MUSCULOSKELETAL NEGATIVE: 1
CARDIOVASCULAR NEGATIVE: 1
GASTROINTESTINAL NEGATIVE: 1
LOSS OF SENSATION IN FEET: 0
EYES NEGATIVE: 1
CONSTITUTIONAL NEGATIVE: 1
NEUROLOGICAL NEGATIVE: 1
PSYCHIATRIC NEGATIVE: 1

## 2024-08-28 ASSESSMENT — PAIN SCALES - GENERAL: PAINLEVEL: 0-NO PAIN

## 2024-08-28 ASSESSMENT — COLUMBIA-SUICIDE SEVERITY RATING SCALE - C-SSRS
6. HAVE YOU EVER DONE ANYTHING, STARTED TO DO ANYTHING, OR PREPARED TO DO ANYTHING TO END YOUR LIFE?: NO
2. HAVE YOU ACTUALLY HAD ANY THOUGHTS OF KILLING YOURSELF?: NO
1. IN THE PAST MONTH, HAVE YOU WISHED YOU WERE DEAD OR WISHED YOU COULD GO TO SLEEP AND NOT WAKE UP?: NO

## 2024-08-28 NOTE — PROGRESS NOTES
Preventive Cardiology Clinic Note    Reason for Visit: Follow up visit  Referring Clinician: No ref. provider found     History of Present Illness: Mr. Go is a 74-year-old gentleman with history of nonobstructive coronary artery disease (LAD 50% in 2015), and elevated coronary artery calcium score, hyperlipidemia and chronic right bundle branch block on ECG who comes for a follow-up visit.  Since his last visit he has not had any intercurrent health events or hospitalizations.  He does not report any exertional chest pain, shortness of breath, palpitations, syncope, fatigue, or exercise intolerance.  He is tolerating his medication well without any adverse effects. 2 years ago he had an echocardiogram that showed normal LV systolic function with EF 55 to 60%, no significant valve abnormality, and normal RVSP.  His repeat lipid panel on the higher dose of atorvastatin shows improved LDL cholesterol at 75 mg/dL.    Past Medical History:  He has a past medical history of Personal history of other diseases of the circulatory system (10/30/2015).    Past Surgical History:  He has a past surgical history that includes Tonsillectomy (04/02/2018).    Social History:  He reports that he quit smoking about 34 years ago. His smoking use included cigarettes. He started smoking about 53 years ago. He has never used smokeless tobacco. He reports current alcohol use of about 5.0 standard drinks of alcohol per week. He reports that he does not use drugs.    Family History:  Family History   Problem Relation Name Age of Onset    Other (sarcoma) Mother      Other (old age) Father         Allergies:  Shellfish derived    Outpatient Medications:  Current Outpatient Medications   Medication Instructions    atorvastatin (LIPITOR) 20 mg, oral, Daily    cholecalciferol (VITAMIN D-3) 50,000 Units, oral, Once Weekly       Review of Systems:  Review of Systems   Constitutional: Negative.   HENT: Negative.     Eyes: Negative.   "  Cardiovascular: Negative.    Respiratory: Negative.     Endocrine: Negative.    Hematologic/Lymphatic: Negative.    Skin: Negative.    Musculoskeletal: Negative.    Gastrointestinal: Negative.    Genitourinary: Negative.    Neurological: Negative.    Psychiatric/Behavioral: Negative.     Allergic/Immunologic: Negative.        Last Recorded Vitals:  Vitals:    08/28/24 0953   BP: 121/62   BP Location: Left arm   Patient Position: Sitting   BP Cuff Size: Large adult   Pulse: 64   SpO2: 95%   Weight: 84.1 kg (185 lb 8 oz)   Height: 1.702 m (5' 7\")       Physical Examination:  General: Well appearing, well-nourished, in no acute distress.  HEENT: Normocephalic atraumatic, pupils equal and reactive to light, extraocular muscles intact, no conjunctival injection, oropharynx clear without exudates.  Neck: Normal carotid arterial pulses, no arterial bruits, no thyromegaly.  Cardiac: Regular rhythm and normal heart rate.  S1, S2 present and normal.  No murmurs, rubs, or gallops.  PMI is nondisplaced. Jugular venous pulsations are normal.  Pulmonary: Normal breath sounds, no increased work of breathing, no wheezes or crackles.  GI: Normal bowel sounds, abdominal aorta not enlarged, no hepatosplenomegaly, no abdominal bruits.  Lower extremities: No cyanosis, clubbing, or edema.  No xanthelasma present. Normal distal pulses.  Skin: Skin intact. No significant rashes or lesions present.  Neuro: Alert and oriented x 3, normal attention and cognition, no focal motor or sensory neurologic deficits.  Psych: Normal affect and mood.  Musculoskeletal: Normal gait normal muscle tone.    Laboratory Studies:  Lab Results   Component Value Date    GLUCOSE 100 (H) 05/06/2024    CALCIUM 9.2 05/06/2024     05/06/2024    K 4.6 05/06/2024    CO2 28 05/06/2024     05/06/2024    BUN 13 05/06/2024    CREATININE 0.99 05/06/2024     Lab Results   Component Value Date    ALT 32 05/06/2024    AST 24 05/06/2024    ALKPHOS 79 05/06/2024    " BILITOT 0.9 05/06/2024         Lab Results   Component Value Date    CHOL 160 05/06/2024    CHOL 170 05/03/2023    CHOL 155 05/02/2022     Lab Results   Component Value Date    HDL 69.9 05/06/2024    HDL 61.7 05/03/2023    HDL 66.0 05/02/2022     Lab Results   Component Value Date    LDLCALC 75 05/06/2024     Lab Results   Component Value Date    TRIG 74 05/06/2024    TRIG 79 05/03/2023    TRIG 65 05/02/2022       Assessment and Plan:  Problem List Items Addressed This Visit          Cardiac and Vasculature    Abnormal EKG    Hyperlipidemia    Right bundle branch block (RBBB)     Other Visit Diagnoses       Non-occlusive coronary artery disease    -  Primary          Mr. Go is a 74-year-old gentleman with history of nonobstructive coronary artery disease (LAD 50% in 2015), and elevated coronary artery calcium score, hyperlipidemia and chronic right bundle branch block on ECG who comes for a follow-up visit.  He remains asymptomatic from a cardiovascular standpoint with normal heart rate and blood pressure.  We discussed ongoing surveillance of his right bundle branch block that will likely be permanent with a plan to repeat an ECG should any new symptoms arise.  Furthermore we discussed continued risk factor modification and primary prevention with his statin medication with improvement in his LDL cholesterol control.  He will continue to follow-up with his primary care physician per routine and I will see him again in 1 year here in the office for follow-up.  Please do not hesitate to contact me if any questions or concerns.    Eugene Cantor MD, FAJOSE MANUEL, FACC  Director,  [facility]  Director,  [facility] Program  Associate Professor of Medicine  [employer]

## 2024-08-29 ENCOUNTER — OFFICE VISIT (OUTPATIENT)
Dept: UROLOGY | Facility: HOSPITAL | Age: 74
End: 2024-08-29
Payer: MEDICARE

## 2024-08-29 VITALS
DIASTOLIC BLOOD PRESSURE: 75 MMHG | WEIGHT: 185 LBS | BODY MASS INDEX: 29.03 KG/M2 | HEART RATE: 75 BPM | HEIGHT: 67 IN | SYSTOLIC BLOOD PRESSURE: 138 MMHG

## 2024-08-29 DIAGNOSIS — R97.20 ELEVATED PSA: Primary | ICD-10-CM

## 2024-08-29 LAB
POC APPEARANCE, URINE: CLEAR
POC BILIRUBIN, URINE: NEGATIVE
POC BLOOD, URINE: NEGATIVE
POC COLOR, URINE: YELLOW
POC GLUCOSE, URINE: NEGATIVE MG/DL
POC KETONES, URINE: NEGATIVE MG/DL
POC LEUKOCYTES, URINE: NEGATIVE
POC NITRITE,URINE: NEGATIVE
POC PH, URINE: 7 PH
POC PROTEIN, URINE: NEGATIVE MG/DL
POC SPECIFIC GRAVITY, URINE: 1.02
POC UROBILINOGEN, URINE: 0.2 EU/DL

## 2024-08-29 PROCEDURE — 99214 OFFICE O/P EST MOD 30 MIN: CPT | Performed by: UROLOGY

## 2024-08-29 PROCEDURE — 1159F MED LIST DOCD IN RCRD: CPT | Performed by: UROLOGY

## 2024-08-29 PROCEDURE — 51798 US URINE CAPACITY MEASURE: CPT | Performed by: UROLOGY

## 2024-08-29 PROCEDURE — 1036F TOBACCO NON-USER: CPT | Performed by: UROLOGY

## 2024-08-29 PROCEDURE — 81002 URINALYSIS NONAUTO W/O SCOPE: CPT | Performed by: UROLOGY

## 2024-08-29 PROCEDURE — 1126F AMNT PAIN NOTED NONE PRSNT: CPT | Performed by: UROLOGY

## 2024-08-29 ASSESSMENT — PAIN SCALES - GENERAL: PAINLEVEL: 0-NO PAIN

## 2024-08-29 NOTE — PROGRESS NOTES
"NAME:Ricky Go  DATE: 2024    Last seen 24               Subjective:     HPI:  74 y.o. male presenting for fuv     PSA 6.14 (24)  MRI Prostate (24) - 91g gland, no evidence of clinical significant cancer    No bothersome urinary issues.  Stream a little slower, sometime difficult to start.  No dysuria or hematuria.  Rare nocturia.  Not sexually active    Past Medical History:   Diagnosis Date    Personal history of other diseases of the circulatory system 10/30/2015    History of coronary stenosis     Past Surgical History:   Procedure Laterality Date    TONSILLECTOMY  2018    Tonsillectomy     Social History     Tobacco Use    Smoking status: Former     Current packs/day: 0.00     Types: Cigarettes     Start date: 10/1/1970     Quit date: 10/1/1989     Years since quittin.9    Smokeless tobacco: Never   Substance Use Topics    Alcohol use: Yes     Alcohol/week: 5.0 standard drinks of alcohol     Types: 5 Shots of liquor per week     Family History   Problem Relation Name Age of Onset    Other (sarcoma) Mother      Other (old age) Father         Current Outpatient Medications on File Prior to Visit   Medication Sig Dispense Refill    atorvastatin (Lipitor) 20 mg tablet TAKE 1 TABLET DAILY 90 tablet 3    cholecalciferol (Vitamin D-3) 50,000 unit capsule Take 1 capsule (50,000 Units) by mouth 1 (one) time per week. 12 capsule 1     No current facility-administered medications on file prior to visit.     Ricky is allergic to shellfish derived.     Review of Systems    14 point ROS reviewed and discussed with the patient. Pertinent positives/negatives discussed in the History of Present Illness (HPI).    FH:  Prostate CA: No  Bladder CA: No  Kidney CA: No  Stones: No    Social History  Occupation: Retired, worked as   Tob: No  Etoh: Yes      Objective:     Body mass index is 28.98 kg/m².   /75   Pulse 75   Ht 1.702 m (5' 7\")   Wt 83.9 kg (185 lb)   BMI 28.98 " kg/m²      Physical Exam   1. Constitutional: NAD, Well-developed, Well-nourished  2. Respiratory: Unlabored breathing, no audible wheezes, no use of accessory muscles   3. Cardiovascular: No JVD, extremities perfused, no edema  4. Abdomen: Soft, non-tender, non-distended, no masses or hernia.  No CVA tenderness.    5. Skin: no visible lesions, plaque, rashes, jaundice  6. Neuro: Gait normal, no focal neurologic deficit  7. Psychiatric: Mood and affect normal and appropriate, alert and oriented    Labs  Lab Results   Component Value Date    PSA 6.14 (H) 05/06/2024     Lab Results   Component Value Date    GFRMALE 78 05/02/2022     Lab Results   Component Value Date    CREATININE 0.99 05/06/2024     Lab Results   Component Value Date    CHOL 160 05/06/2024     Lab Results   Component Value Date    HDL 69.9 05/06/2024     Lab Results   Component Value Date    CHHDL 2.3 05/06/2024     Lab Results   Component Value Date    LDLF 93 05/03/2023     Lab Results   Component Value Date    VLDL 15 05/06/2024     Lab Results   Component Value Date    TRIG 74 05/06/2024     Lab Results   Component Value Date    HCT 44.3 05/02/2022     TECHNIQUE:  Multiplanar MRI of the pelvis was obtained focused on the prostate  gland and following a screening protocol including axial, sagittal  and coronal T2 weighted SSFSE, axial T2 FSE and axial DWI. No  intravenous contrast was administered.      FINDINGS:  PROSTATE VOLUME:  The prostate measures 5.7 cm x 4.7 cm  x 6.5 cm in right-to-left,  anterior-posterior and craniocaudal dimension.      Prostate weight is estimated at 91g.      PROSTATE PARENCHYMA:  There is severe hypertrophy of the of the transition zone, consistent  with benign prostatic hyperplasia. The peripheral zone is partly  compressed. No definite focal lesion seen in the peripheral or the  transition zone.      EXTRACAPSULAR EXTENSION:  None.      SEMINAL VESICLES:  Within normal limits.      PELVIC LYMPH NODES:  No  abnormally enlarged pelvic lymph nodes are identified.      PERITONEUM:  No free or loculated fluid collections are evident in the pelvis.      OTHER ORGANS:  Urinary bladder is partially distended and diffusely thick-walled.  Layering material within the bladder may represent debris or calculi.  Visualized sigmoid colon demonstrates diverticulosis.      BONES:  No focal lesions are noted in the bone.      Exam Quality:  Is T2WI weighted imaging of diagnostic quality:  Yes. T2WI  assessment:  Adequate. Is DWI of diagnostic quality:  Yes. DWI  assessment:  Adequate. Is DCE of diagnostic quality:  N/A. DCE  assessment:  N/A. PI-QUAL score:  At least two sequences taken  together are of diagnostic quality Comments:      IMPRESSION:  1. There is no evidence of clinically significant neoplasm.  2. Severe hypertrophy of the transition zone consistent with benign  prostatic hyperplasia.    Assessment/Plan:   Ricky Go presents with     1. Elevated PSA      PSA 6.14 (5/6/24)  MRI Prostate (2/25/24) - 91g gland, no evidence of clinical significant cancer    Discussed prostate cancer screening recommendations.  Discussed shared decision making on continuing PSA screening.  No bothersome urinary complaints.      Will cont fu with PCP, fu with me as needed

## 2024-10-06 ASSESSMENT — DERMATOLOGY QUALITY OF LIFE (QOL) ASSESSMENT
RATE HOW BOTHERED YOU ARE BY SYMPTOMS OF YOUR SKIN PROBLEM (EG, ITCHING, STINGING BURNING, HURTING OR SKIN IRRITATION): 0 - NEVER BOTHERED
RATE HOW BOTHERED YOU ARE BY EFFECTS OF YOUR SKIN PROBLEMS ON YOUR ACTIVITIES (EG, GOING OUT, ACCOMPLISHING WHAT YOU WANT, WORK ACTIVITIES OR YOUR RELATIONSHIPS WITH OTHERS): 0 - NEVER BOTHERED
RATE HOW BOTHERED YOU ARE BY SYMPTOMS OF YOUR SKIN PROBLEM (EG, ITCHING, STINGING BURNING, HURTING OR SKIN IRRITATION): 0 - NEVER BOTHERED
WHAT SINGLE SKIN CONDITION LISTED BELOW IS THE PATIENT ANSWERING THE QUALITY-OF-LIFE ASSESSMENT QUESTIONS ABOUT: NONE OF THE ABOVE
WHAT SINGLE SKIN CONDITION LISTED BELOW IS THE PATIENT ANSWERING THE QUALITY-OF-LIFE ASSESSMENT QUESTIONS ABOUT: NONE OF THE ABOVE
RATE HOW BOTHERED YOU ARE BY EFFECTS OF YOUR SKIN PROBLEMS ON YOUR ACTIVITIES (EG, GOING OUT, ACCOMPLISHING WHAT YOU WANT, WORK ACTIVITIES OR YOUR RELATIONSHIPS WITH OTHERS): 0 - NEVER BOTHERED
ARE THERE EXCLUSIONS OR EXCEPTIONS FOR THE QUALITY OF LIFE ASSESSMENT: NO
DATE THE QUALITY-OF-LIFE ASSESSMENT WAS COMPLETED: 67124
RATE HOW EMOTIONALLY BOTHERED YOU ARE BY YOUR SKIN PROBLEM (FOR EXAMPLE, WORRY, EMBARRASSMENT, FRUSTRATION): 0 - NEVER BOTHERED
RATE HOW EMOTIONALLY BOTHERED YOU ARE BY YOUR SKIN PROBLEM (FOR EXAMPLE, WORRY, EMBARRASSMENT, FRUSTRATION): 0 - NEVER BOTHERED

## 2024-10-06 ASSESSMENT — PATIENT GLOBAL ASSESSMENT (PGA): PATIENT GLOBAL ASSESSMENT: PATIENT GLOBAL ASSESSMENT:  1 - CLEAR

## 2024-10-11 ENCOUNTER — APPOINTMENT (OUTPATIENT)
Dept: DERMATOLOGY | Facility: CLINIC | Age: 74
End: 2024-10-11
Payer: MEDICARE

## 2024-10-11 DIAGNOSIS — D22.9 MULTIPLE BENIGN NEVI: ICD-10-CM

## 2024-10-11 DIAGNOSIS — Z12.83 SCREENING EXAM FOR SKIN CANCER: ICD-10-CM

## 2024-10-11 DIAGNOSIS — L81.4 LENTIGO: ICD-10-CM

## 2024-10-11 DIAGNOSIS — L82.1 SEBORRHEIC KERATOSIS: ICD-10-CM

## 2024-10-11 DIAGNOSIS — L57.0 ACTINIC KERATOSIS: Primary | ICD-10-CM

## 2024-10-11 PROCEDURE — 1159F MED LIST DOCD IN RCRD: CPT | Performed by: STUDENT IN AN ORGANIZED HEALTH CARE EDUCATION/TRAINING PROGRAM

## 2024-10-11 PROCEDURE — 17000 DESTRUCT PREMALG LESION: CPT

## 2024-10-11 PROCEDURE — 17003 DESTRUCT PREMALG LES 2-14: CPT

## 2024-10-11 PROCEDURE — 99213 OFFICE O/P EST LOW 20 MIN: CPT | Performed by: STUDENT IN AN ORGANIZED HEALTH CARE EDUCATION/TRAINING PROGRAM

## 2024-10-11 ASSESSMENT — ITCH NUMERIC RATING SCALE: HOW SEVERE IS YOUR ITCHING?: 0

## 2024-10-11 NOTE — PROGRESS NOTES
Subjective     Ricky Go is a 74 y.o. male who presents for the following: Skin Check (Right ear, right temple, left temple, right undereye). Last skin cancer was SCCIS 4/11/23 on left preauricular cheek. Several basal and squamous cell carcinomas in the past.    Review of Systems:  No other skin or systemic complaints other than what is documented elsewhere in the note.    The following portions of the chart were reviewed this encounter and updated as appropriate:         Skin Cancer History  No skin cancer on file.      Specialty Problems          Dermatology Problems    Actinic keratosis of left temple    Hemangioma of skin and subcutaneous tissue    Melanocytic nevi of lower limb, including hip    Melanocytic nevi of other parts of face    Melanocytic nevi of right upper limb, including shoulder    Melanocytic nevi of trunk    Neoplasm of uncertain behavior of skin    Other melanin hyperpigmentation    Other seborrheic keratosis    Personal history of other malignant neoplasm of skin     Lesion 1: Nodular Basal Cell Carcinoma. Year Diagnosed: 2019. June. Location: Left [place]. Treatment(s): mohs done by Dr. Robertson 7/26/19. Pathology: [id number] Lesion 2: Invasive squamous cell carcinoma. Year Diagnosed: 2022. May. Location: Right Ear. Treatment(s): Mohs 7/25/2022 Dr. 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 Location: Left Preauricular cheek. Treatment(s): MOHS done by Dr. Inna Robertson on 8/28/23 Pathology: [id number]          Scar condition and fibrosis of skin        Objective   Well appearing patient in no apparent distress; mood and affect are within normal limits.    A full examination was performed including scalp, head, eyes, ears, nose, lips, neck, chest, axillae, abdomen, back, bilateral upper extremities, hands. Defers exmaination below the . All findings within normal limits unless otherwise noted below.    Assessment/Plan   1. Actinic  keratosis (7)  Left Parotid Area, Left Temple, Mid Parietal Scalp (2), Right Malar Cheek, Right Preauricular Area, Right Temple  Erythematous macules with gritty scale.    - The premalignant nature of the disorder was reviewed and treatment options were reviewed.   - Patient agreeable to treatment with cryotherapy today.  Sites confirmed. Risks and benefits reviewed including but not limited to pain, redness, swelling, blister, scab, healing with hypo or hyperpigmentation, and scar. Chance of recurrence or persistence reviewed.     Destr of lesion - Left Parotid Area, Left Temple, Mid Parietal Scalp (2), Right Malar Cheek, Right Preauricular Area, Right [place]  Complexity: simple    Destruction method: cryotherapy    Informed consent: discussed and consent obtained    Lesion destroyed using liquid nitrogen: Yes    Region frozen until ice ball extended beyond lesion: Yes    Cryotherapy cycles:  1  Outcome: patient tolerated procedure well with no complications    Post-procedure details: wound care instructions given      Related Procedures  Follow Up In Dermatology - Established Patient    2. Seborrheic keratosis  Stuck on verrucous, tan-brown papules and plaques.      Seborrheic Keratoses - the benign nature of these lesions was discussed with the patient today and reassurance provided.  No treatment is medically indicated for these lesions at this time.      3. Lentigo  Scattered tan macules in sun-exposed areas.    Solar Lentigines and photodamage.  The clinically benign-appearing nature of these lesions and their relation to chronic sun exposure were discussed with the patient today and reassurance provided.  None of these lesions meet threshold for biopsy today, and thus no treatment is medically indicated for these lesions at this time.  The signs and symptoms of skin cancer were reviewed and the patient was advised to practice sun protection and sun avoidance, use daily sunscreen, and perform regular self skin  exams.  The patient was instructed to monitor these lesions for any changes, such as in size, shape, or color, or associated symptoms and to call our office to schedule a return visit for re-evaluation if any such changes or symptoms are noticed in the future.  The patient expressed understanding and is in agreement with this plan.    4. Multiple benign nevi  Scattered, uniform and benign-appearing, regular brown melanocytic papules and macules.    Clinically benign- to slightly atypical-appearing nevi - the clinically benign- to slightly atypical-appearing nature of the patient's nevi was discussed with the patient today.  None of the patient's nevi meet threshold for biopsy today.  I emphasized the importance of performing monthly self-skin exams using the ABCDs of monitoring moles, which were reviewed with the patient today.  I also emphasized the importance of sun avoidance and sun protection with daily sunscreen use.  The patient expressed understanding and is in agreement with this plan.     5. Screening exam for skin cancer    Related Procedures  Follow Up In Dermatology - Established Patient      Return to clinic in 6 months for full body skin check.    Laina Landers MD  Department of Dermatology    I was present during all key portions of visit including history, exam, discussion/plan and/or procedures and directly supervised our resident during all portions of the visit, follow up care, medications and more    Andrea Carballo MD

## 2025-01-12 DIAGNOSIS — E55.9 VITAMIN D DEFICIENCY: ICD-10-CM

## 2025-01-13 RX ORDER — ASPIRIN 325 MG
50000 TABLET, DELAYED RELEASE (ENTERIC COATED) ORAL
Qty: 12 CAPSULE | Refills: 1 | Status: SHIPPED | OUTPATIENT
Start: 2025-01-19

## 2025-04-11 ENCOUNTER — APPOINTMENT (OUTPATIENT)
Dept: DERMATOLOGY | Facility: CLINIC | Age: 75
End: 2025-04-11
Payer: MEDICARE

## 2025-04-11 DIAGNOSIS — L81.4 LENTIGO: ICD-10-CM

## 2025-04-11 DIAGNOSIS — L57.0 ACTINIC KERATOSIS: ICD-10-CM

## 2025-04-11 DIAGNOSIS — L82.1 SEBORRHEIC KERATOSIS: Primary | ICD-10-CM

## 2025-04-11 DIAGNOSIS — D48.5 NEOPLASM OF UNCERTAIN BEHAVIOR OF SKIN: ICD-10-CM

## 2025-04-11 DIAGNOSIS — D22.9 MULTIPLE BENIGN NEVI: ICD-10-CM

## 2025-04-11 DIAGNOSIS — Z12.83 SCREENING EXAM FOR SKIN CANCER: ICD-10-CM

## 2025-04-11 PROCEDURE — 1159F MED LIST DOCD IN RCRD: CPT | Performed by: STUDENT IN AN ORGANIZED HEALTH CARE EDUCATION/TRAINING PROGRAM

## 2025-04-11 PROCEDURE — 11102 TANGNTL BX SKIN SINGLE LES: CPT | Performed by: STUDENT IN AN ORGANIZED HEALTH CARE EDUCATION/TRAINING PROGRAM

## 2025-04-11 PROCEDURE — 17003 DESTRUCT PREMALG LES 2-14: CPT | Performed by: STUDENT IN AN ORGANIZED HEALTH CARE EDUCATION/TRAINING PROGRAM

## 2025-04-11 PROCEDURE — 17000 DESTRUCT PREMALG LESION: CPT | Performed by: STUDENT IN AN ORGANIZED HEALTH CARE EDUCATION/TRAINING PROGRAM

## 2025-04-11 PROCEDURE — 99213 OFFICE O/P EST LOW 20 MIN: CPT | Performed by: STUDENT IN AN ORGANIZED HEALTH CARE EDUCATION/TRAINING PROGRAM

## 2025-04-11 ASSESSMENT — DERMATOLOGY QUALITY OF LIFE (QOL) ASSESSMENT
RATE HOW EMOTIONALLY BOTHERED YOU ARE BY YOUR SKIN PROBLEM (FOR EXAMPLE, WORRY, EMBARRASSMENT, FRUSTRATION): 0 - NEVER BOTHERED
ARE THERE EXCLUSIONS OR EXCEPTIONS FOR THE QUALITY OF LIFE ASSESSMENT: NO
RATE HOW BOTHERED YOU ARE BY SYMPTOMS OF YOUR SKIN PROBLEM (EG, ITCHING, STINGING BURNING, HURTING OR SKIN IRRITATION): 0 - NEVER BOTHERED
WHAT SINGLE SKIN CONDITION LISTED BELOW IS THE PATIENT ANSWERING THE QUALITY-OF-LIFE ASSESSMENT QUESTIONS ABOUT: NONE OF THE ABOVE
DATE THE QUALITY-OF-LIFE ASSESSMENT WAS COMPLETED: 67306
RATE HOW BOTHERED YOU ARE BY EFFECTS OF YOUR SKIN PROBLEMS ON YOUR ACTIVITIES (EG, GOING OUT, ACCOMPLISHING WHAT YOU WANT, WORK ACTIVITIES OR YOUR RELATIONSHIPS WITH OTHERS): 0 - NEVER BOTHERED

## 2025-04-11 ASSESSMENT — DERMATOLOGY PATIENT ASSESSMENT
HAVE YOU HAD OR DO YOU HAVE VASCULAR DISEASE: YES
HAVE YOU HAD OR DO YOU HAVE A STAPH INFECTION: NO
ARE YOU AN ORGAN TRANSPLANT RECIPIENT: NO
DO YOU HAVE ANY NEW OR CHANGING LESIONS: NO
DO YOU USE A TANNING BED: NO
DO YOU USE SUNSCREEN: OCCASIONALLY

## 2025-04-11 ASSESSMENT — ITCH NUMERIC RATING SCALE: HOW SEVERE IS YOUR ITCHING?: 0

## 2025-04-11 ASSESSMENT — PATIENT GLOBAL ASSESSMENT (PGA): PATIENT GLOBAL ASSESSMENT: PATIENT GLOBAL ASSESSMENT:  1 - CLEAR

## 2025-04-11 NOTE — PROGRESS NOTES
Subjective     Ricky Go is a 74 y.o. male who presents for the following: Skin Check (FBSE, no areas of concern. HX of skin cancer).     Review of Systems:  No other skin or systemic complaints other than what is documented elsewhere in the note.    The following portions of the chart were reviewed this encounter and updated as appropriate:         Skin Cancer History  No skin cancer on file.      Specialty Problems          Dermatology Problems    Actinic keratosis of left temple    Hemangioma of skin and subcutaneous tissue    Melanocytic nevi of lower limb, including hip    Melanocytic nevi of other parts of face    Melanocytic nevi of right upper limb, including shoulder    Melanocytic nevi of trunk    Neoplasm of uncertain behavior of skin    Other melanin hyperpigmentation    Other seborrheic keratosis    Personal history of other malignant neoplasm of skin     Lesion 1: Nodular Basal Cell Carcinoma. Year Diagnosed: 2019. June. Location: Left [place]. Treatment(s): mohs done by Dr. Robertson 7/26/19. Pathology: [id number] Lesion 2: Invasive squamous cell carcinoma. Year Diagnosed: 2022. May. Location: Right Ear. Treatment(s): Mohs 7/25/2022 Dr. 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 April. Location: Left Preauricular cheek. Treatment(s): MOHS done by Dr. Inna Robertson on 8/28/23 Pathology: [id number]          Scar condition and fibrosis of skin        Objective   Well appearing patient in no apparent distress; mood and affect are within normal limits.    A full examination was performed including scalp, head, eyes, ears, nose, lips, neck, chest, axillae, abdomen, back, buttocks, bilateral upper extremities, bilateral lower extremities, hands, feet, fingers, toes, fingernails, and toenails. All findings within normal limits unless otherwise noted below.    Assessment/Plan   1. Seborrheic keratosis  Stuck on verrucous, tan-brown papules and  plaques.      Seborrheic Keratoses - the benign nature of these lesions was discussed with the patient today and reassurance provided.  No treatment is medically indicated for these lesions at this time.      2. Screening exam for skin cancer    Related Procedures  Follow Up In Dermatology - Established Patient    3. Lentigo  Scattered tan macules in sun-exposed areas.    Solar Lentigines and photodamage.  The clinically benign-appearing nature of these lesions and their relation to chronic sun exposure were discussed with the patient today and reassurance provided.  None of these lesions meet threshold for biopsy today, and thus no treatment is medically indicated for these lesions at this time.  The signs and symptoms of skin cancer were reviewed and the patient was advised to practice sun protection and sun avoidance, use daily sunscreen, and perform regular self skin exams.  The patient was instructed to monitor these lesions for any changes, such as in size, shape, or color, or associated symptoms and to call our office to schedule a return visit for re-evaluation if any such changes or symptoms are noticed in the future.  The patient expressed understanding and is in agreement with this plan.    4. Multiple benign nevi  Scattered, uniform and benign-appearing, regular brown melanocytic papules and macules.    Clinically benign- to slightly atypical-appearing nevi - the clinically benign- to slightly atypical-appearing nature of the patient's nevi was discussed with the patient today.  None of the patient's nevi meet threshold for biopsy today.  I emphasized the importance of performing monthly self-skin exams using the ABCDs of monitoring moles, which were reviewed with the patient today.  I also emphasized the importance of sun avoidance and sun protection with daily sunscreen use.  The patient expressed understanding and is in agreement with this plan.     5. Neoplasm of uncertain behavior of skin  Right  Medial Thigh  Reddish patch with rim of scale              Lesion biopsy  Type of biopsy: tangential    Informed consent: discussed and consent obtained    Timeout: patient name, date of birth, surgical site, and procedure verified    Procedure prep:  Patient was prepped and draped  Anesthesia: the lesion was anesthetized in a standard fashion    Anesthetic:  1% lidocaine w/ epinephrine 1-100,000 local infiltration  Instrument used: DermaBlade    Hemostasis achieved with: aluminum chloride    Outcome: patient tolerated procedure well    Post-procedure details: sterile dressing applied and wound care instructions given    Dressing type: petrolatum and bandage      Specimen 1 - Dermatopathology- DERM LAB  Differential Diagnosis: poro  Check Margins Yes/No?:    Comments:    Dermpath Lab: Routine Histopathology (formalin-fixed tissue)    6. Actinic keratosis (3)  Generalized, Right Buccal Cheek, Right Malar Cheek  Erythematous macules with gritty scale.    Destr of lesion - Generalized, Right Buccal Cheek, Right Malar Cheek  Complexity: simple    Destruction method: cryotherapy    Informed consent: discussed and consent obtained    Lesion destroyed using liquid nitrogen: Yes    Region frozen until ice ball extended beyond lesion: Yes    Cryotherapy cycles:  1  Outcome: patient tolerated procedure well with no complications    Post-procedure details: wound care instructions given

## 2025-04-15 LAB
LABORATORY COMMENT REPORT: NORMAL
PATH REPORT.FINAL DX SPEC: NORMAL
PATH REPORT.GROSS SPEC: NORMAL
PATH REPORT.MICROSCOPIC SPEC OTHER STN: NORMAL
PATH REPORT.RELEVANT HX SPEC: NORMAL
PATH REPORT.TOTAL CANCER: NORMAL

## 2025-04-29 ENCOUNTER — APPOINTMENT (OUTPATIENT)
Dept: PRIMARY CARE | Facility: CLINIC | Age: 75
End: 2025-04-29
Payer: MEDICARE

## 2025-05-06 ENCOUNTER — APPOINTMENT (OUTPATIENT)
Dept: PRIMARY CARE | Facility: CLINIC | Age: 75
End: 2025-05-06
Payer: MEDICARE

## 2025-06-02 ENCOUNTER — APPOINTMENT (OUTPATIENT)
Dept: UROLOGY | Facility: HOSPITAL | Age: 75
End: 2025-06-02
Payer: MEDICARE

## 2025-06-02 DIAGNOSIS — R97.20 ELEVATED PSA: Primary | ICD-10-CM

## 2025-06-02 DIAGNOSIS — R31.0 GROSS HEMATURIA: ICD-10-CM

## 2025-06-02 DIAGNOSIS — R39.9 LOWER URINARY TRACT SYMPTOMS (LUTS): ICD-10-CM

## 2025-06-02 PROCEDURE — 51798 US URINE CAPACITY MEASURE: CPT | Performed by: UROLOGY

## 2025-06-02 PROCEDURE — 1159F MED LIST DOCD IN RCRD: CPT | Performed by: UROLOGY

## 2025-06-02 PROCEDURE — 99214 OFFICE O/P EST MOD 30 MIN: CPT | Performed by: UROLOGY

## 2025-06-02 PROCEDURE — 81003 URINALYSIS AUTO W/O SCOPE: CPT | Mod: QW | Performed by: UROLOGY

## 2025-06-02 PROCEDURE — G2211 COMPLEX E/M VISIT ADD ON: HCPCS | Performed by: UROLOGY

## 2025-06-02 NOTE — PROGRESS NOTES
NAME:Ricky Go  DATE: 2025    Last seen 24               Subjective:     HPI:  75 y.o. male presenting for fuv     PSA 6.14 (24)  MRI Prostate (24) - 91g gland, no evidence of clinical significant cancer    Reports some blood in urine after a bike ride.  Cleared quickly.  Otherwise voiding well.  Occasional slow stream.  Does feel like he empties well.      JOE 8  AUA SS 7, QOL 1    Past Medical History:   Diagnosis Date    Personal history of other diseases of the circulatory system 10/30/2015    History of coronary stenosis     Past Surgical History:   Procedure Laterality Date    TONSILLECTOMY  2018    Tonsillectomy     Social History     Tobacco Use    Smoking status: Former     Current packs/day: 0.00     Types: Cigarettes     Start date: 10/1/1970     Quit date: 10/1/1989     Years since quittin.6    Smokeless tobacco: Never   Substance Use Topics    Alcohol use: Yes     Alcohol/week: 5.0 standard drinks of alcohol     Types: 5 Shots of liquor per week     Family History   Problem Relation Name Age of Onset    Other (sarcoma) Mother      Other (old age) Father         Current Outpatient Medications on File Prior to Visit   Medication Sig Dispense Refill    atorvastatin (Lipitor) 20 mg tablet TAKE 1 TABLET DAILY 90 tablet 3    cholecalciferol (Vitamin D-3) 50,000 unit capsule TAKE 1 CAPSULE (41535 UNITS) BY MOUTH ONE TIME PER WEEK 12 capsule 1     No current facility-administered medications on file prior to visit.     Ricky is allergic to shellfish derived.     Review of Systems    14 point ROS reviewed and discussed with the patient. Pertinent positives/negatives discussed in the History of Present Illness (HPI).    FH:  Prostate CA: No  Bladder CA: No  Kidney CA: No  Stones: No    Social History  Occupation: Retired, worked as   Tob: No  Etoh: Yes      Objective:     Physical Exam   1. Constitutional: NAD, Well-developed, Well-nourished  2. Respiratory:  Unlabored breathing, no audible wheezes, no use of accessory muscles   3. Cardiovascular: No JVD, extremities perfused, no edema  4. Abdomen: Soft, non-tender, non-distended, no masses or hernia.  No CVA tenderness.    5. Skin: no visible lesions, plaque, rashes, jaundice  6. Neuro: Gait normal, no focal neurologic deficit  7. Psychiatric: Mood and affect normal and appropriate, alert and oriented    Labs  Lab Results   Component Value Date    PSA 6.14 (H) 05/06/2024     Lab Results   Component Value Date    GFRMALE 78 05/02/2022     Lab Results   Component Value Date    CREATININE 0.99 05/06/2024     Lab Results   Component Value Date    CHOL 160 05/06/2024     Lab Results   Component Value Date    HDL 69.9 05/06/2024     Lab Results   Component Value Date    CHHDL 2.3 05/06/2024     Lab Results   Component Value Date    LDLF 93 05/03/2023     Lab Results   Component Value Date    VLDL 15 05/06/2024     Lab Results   Component Value Date    TRIG 74 05/06/2024     Lab Results   Component Value Date    HCT 44.3 05/02/2022     PVR 5cc  UA negative for blood or infection    Assessment/Plan:   Ricky Go presents with     1. Elevated PSA    2. Gross hematuria      PSA 6.14 (5/6/24)  MRI Prostate (2/25/24) - 91g gland, no evidence of clinical significant cancer  Discussed prostate cancer screening recommendations.  Discussed shared decision making on continuing PSA screening.      UA today negative for blood or infection  Discussed his intermittent hematuria after bike riding likely related to prostate.  Options for full workup discussed

## 2025-06-26 ENCOUNTER — TELEPHONE (OUTPATIENT)
Dept: PRIMARY CARE | Facility: CLINIC | Age: 75
End: 2025-06-26
Payer: MEDICARE

## 2025-06-30 DIAGNOSIS — E55.9 VITAMIN D DEFICIENCY: ICD-10-CM

## 2025-06-30 RX ORDER — ASPIRIN 325 MG
1.25 TABLET, DELAYED RELEASE (ENTERIC COATED) ORAL
Qty: 12 CAPSULE | Refills: 1 | Status: SHIPPED | OUTPATIENT
Start: 2025-06-30

## 2026-01-12 ENCOUNTER — APPOINTMENT (OUTPATIENT)
Dept: DERMATOLOGY | Facility: CLINIC | Age: 76
End: 2026-01-12
Payer: MEDICARE

## 2026-06-01 ENCOUNTER — APPOINTMENT (OUTPATIENT)
Dept: UROLOGY | Facility: HOSPITAL | Age: 76
End: 2026-06-01
Payer: MEDICARE